# Patient Record
Sex: MALE | Race: BLACK OR AFRICAN AMERICAN
[De-identification: names, ages, dates, MRNs, and addresses within clinical notes are randomized per-mention and may not be internally consistent; named-entity substitution may affect disease eponyms.]

---

## 2017-02-02 ENCOUNTER — HOSPITAL ENCOUNTER (EMERGENCY)
Dept: HOSPITAL 53 - M ED | Age: 37
Discharge: HOME | End: 2017-02-02
Payer: COMMERCIAL

## 2017-02-02 DIAGNOSIS — F17.210: ICD-10-CM

## 2017-02-02 DIAGNOSIS — J45.909: ICD-10-CM

## 2017-02-02 DIAGNOSIS — M54.5: Primary | ICD-10-CM

## 2017-02-02 DIAGNOSIS — G89.29: ICD-10-CM

## 2017-02-02 DIAGNOSIS — Z79.899: ICD-10-CM

## 2017-02-02 NOTE — EDDOCDS
Physician Documentation                                                                           

[facility]                                                                         

Name: Nikita Sr                                                                                 

Age: 36 yrs                                                                                       

Sex: Male                                                                                         

: 1980                                                                                   

MRN: [id number]                                                                                     

Arrival Date: 2017                                                                          

Time: 11:39                                                                                       

Account#: [id number]                                                                              

Bed TR7                                                                                           

Private MD: [facility]                                                                  

Disposition:                                                                                      

17 12:27 Discharged to Home/Self Care. Impression: Low back pain.                           

- Condition is Stable.                                                                            

- Discharge Instructions: Chronic Back Pain, Back Pain, Adult, Easy-to-Read.                      

- Prescriptions for Diclofenac Sodium 75 mg Oral Tablet, Delayed Release (E.C.) - take            

1 tablet by ORAL route 2 times per day; 30 tablet. Robaxin- 750 750 mg Oral Tablet -            

take 1 tablet by ORAL route every 6 hours As needed; 40 tablet.                                 

- Work Release Form - 1 day, Medication Reconciliation, Local Pharmacy Hours form.                

- Follow up: [facility]; When: Call to arrange an appointment; Reason:                  

Further diagnostic work-up, Recheck today's complaints, Continuance of care.                    

- Problem is an acute exacerbation.                                                               

- Symptoms are unchanged.                                                                         

                                                                                                  

                                                                                                  

                                                                                                  

Historical:                                                                                       

- Allergies: No known drug Allergies;                                                             

- Home Meds:                                                                                      

1. albuterol sulfate 90 mcg/actuation Inhl HFAA every 4-6 hours                                 

2. Proventil 90 mcg/actuation Inhl aero prn                                                     

3. baclofen 10 mg Oral tab BID                                                                  

- PMHx: Asthma; chronic back pain;                                                                

- PSHx: none;                                                                                     

- Social history: Smoking status: Patient uses tobacco products, light tobacco smoker.            

No barriers to communication noted, The patient speaks fluent English, Speaks                   

appropriately for age.                                                                          

- Family history: Not pertinent.                                                                  

- : The pt / caregiver states he / she is not on anticoagulants. Home medication list             

is obtained from the patient.                                                                   

- Exposure Risk Screening:: None identified.                                                      

                                                                                                  

                                                                                                  

Vital Signs:                                                                                      

                                                                                             

11:41  / 87; Pulse 72; Resp 18; Temp 99.0(O); Pulse Ox 99% on R/A; Weight 102.06 kg /   elp 

      225 lbs (R); Height 5 ft. 11 in. (180.34 cm) (R); Pain 10/10;                               

11:41 Body Mass Index 31.38 (102.06 kg, 180.34 cm)                                            elp 

                                                                                                  

Signatures:                                                                                       

Ifeoma White RN                      Soheila BlevinsRN                    RN   jo3                                                  

Ezio Traore PA PA   btw                                                  

                                                                                                  

**************************************************************************************************

MTDD

## 2017-02-02 NOTE — EDDOCDS
Nurse's Notes                                                                                     

[facility]                                                                         

Name: Nikita Sr                                                                                 

Age: 36 yrs                                                                                       

Sex: Male                                                                                         

: 1980                                                                                   

MRN: [id number]                                                                                     

Arrival Date: 2017                                                                          

Time: 11:39                                                                                       

Account#: [id number]                                                                              

Bed TR7                                                                                           

Private MD: [facility]                                                                  

Diagnosis: Low back pain                                                                          

                                                                                                  

Presentation:                                                                                     

                                                                                             

11:48 Presenting complaint: Patient states: History of chronic back pain. Today, is worse and jo3 

      is radiating to hips and legs. Adult Sepsis Screening: The patient does not have new or     

      worsening altered mentation. Patient's respiratory rate is less than 22. Systolic blood     

      pressure is greater than 100. Patient has a qSOFA score of 0- Negative Sepsis Screen.       

      Suicide/Homicide risk assessment- the patient denies having any suicidal and/or             

      homicidal ideations and does not present with any other emotional, behavioral or mental     

      health complaints.  Status: Patient is not a  or              

      dependent. Transition of care: patient was not received from another setting of care.       

11:48 Acuity: REGULO Level 5                                                                     jo3 

11:48 Acuity: REGULO Level 4                                                                     jo3 

11:48 Method Of Arrival: Walkin/Carried/Asstd                                                 jo3 

                                                                                                  

Triage Assessment:                                                                                

11:50 General: Appears in no apparent distress, Behavior is appropriate for age, cooperative. jo3 

      Pain: Pain currently is 10 out of 10 on a pain scale. HIV screening NA for this visit       

      Offered previously. Neurological: Level of Consciousness is awake, alert, Oriented to       

      person, place, time. Respiratory: Airway is patent Respiratory effort is even,              

      unlabored.                                                                                  

                                                                                                  

Historical:                                                                                       

- Allergies: No known drug Allergies;                                                             

- Home Meds:                                                                                      

1. albuterol sulfate 90 mcg/actuation Inhl HFAA every 4-6 hours                                 

2. Proventil 90 mcg/actuation Inhl aero prn                                                     

3. baclofen 10 mg Oral tab BID                                                                  

- PMHx: Asthma; chronic back pain;                                                                

- PSHx: none;                                                                                     

- Social history: Smoking status: Patient uses tobacco products, light tobacco smoker.            

No barriers to communication noted, The patient speaks fluent English, Speaks                   

appropriately for age.                                                                          

- Family history: Not pertinent.                                                                  

- : The pt / caregiver states he / she is not on anticoagulants. Home medication list             

is obtained from the patient.                                                                   

- Exposure Risk Screening:: None identified.                                                      

                                                                                                  

                                                                                                  

Screenin:41 Screening information is obtained from prior medical records. Fall risk: No risks       kcs 

      identified. Assistance ADL's: requires no assistance with activities of daily living.       

      Abuse/DV Screen: The patient / caregiver reports he/she is: not in a situation that         

      causes fear, pain or injury. Nutritional screening: No deficits noted. Advance              

      Directives: Currently, there is no health care proxy. There is no living will. home         

      support is adequate.                                                                        

                                                                                                  

Assessment:                                                                                       

12:39 General: Appears comfortable, well developed, well nourished, well groomed, Behavior is kcs 

      cooperative, pleasant. Pain: Complains of pain in low back. Awake, alert, oriented.         

      Skin warm and dry. Moves all extremities. Respirations unlabored. No apparent distress.     

      The patient / caregiver is instructed regarding the plan of care and ED course.             

      Physical assessment to be completed by PA/VALENTE.                                             

                                                                                                  

Vital Signs:                                                                                      

11:41  / 87; Pulse 72; Resp 18; Temp 99.0(O); Pulse Ox 99% on R/A; Weight 102.06 kg     elp 

      (R); Height 5 ft. 11 in. (180.34 cm) (R); Pain 10/10;                                       

11:41 Body Mass Index 31.38 (102.06 kg, 180.34 cm)                                            elp 

                                                                                                  

Vitals:                                                                                           

11:41 Log In Time: 2017 at 11:35.                                                elp 

                                                                                                  

ED Course:                                                                                        

11:40 Patient visited by Sushila Ba PCA.                                                  elp 

11:40 [facility] is Private Physician.                                              elp 

11:40 Patient moved to Waiting                                                                elp 

11:41 Patient visited by Sushila Ba PCA.                                                  elp 

11:41 Patient moved to Pre RCE                                                                elp 

11:49 Triage Initiated                                                                        jo3 

11:50 Patient visited by Soheila Dudley RN.                                                jo3 

12:05 Patient moved to Triage 1                                                               kcs 

12:18 [facility]                                                         btw 

12:18 Aubrie Peters MD is Attending Physician.                                      btw 

12:18 Patient visited by Ezio Traore PA.                                              btw 

12:27 [facility] is Referral Physician.                                             btw 

12:38 Patient moved to TR7                                                                    kcs 

12:39 Patient has correct armband on for positive identification.                             kcs 

12:40 No IV's were initiated during this patient's visit. No procedures done that require     kcs 

      assistance.                                                                                 

                                                                                                  

Order Results:                                                                                    

There are currently no results for this order.                                                    

Outcome:                                                                                          

12:27 Discharge ordered by Provider.                                                          btw 

12:39 The following High Risk Discharge criteria are identified: None. Discharged to home     kcs 

      ambulatory. Condition: stable. Discharge instructions given to patient, Instructed on       

      discharge instructions, follow up and referral plans. medication usage, no driving          

      heavy equipment, no drinking with medication, Demonstrated understanding of                 

      instructions, medications, Pt was receptive of discharge instructions/ teaching. Work       

      note provided to patient. No special radiology studies were completed.                      

12:40 Discharge Assessment: Patient awake, alert and oriented x 3. No cognitive and/or        kcs 

      functional deficits noted. Patient verbalized understanding of disposition                  

      instructions. Patient awake and alert. patient administered narcotics - no. Property        

      sent home with patient.                                                                     

12:41 Patient left the ED.                                                                    kcs 

                                                                                                  

Signatures:                                                                                       

Ifeoma White, RN                      RN   Soheila Camarillo RN                    RN   Ezio Rucker PA PA   btw                                                  

Jesenia, Sushila, PCA                      PCA  elp                                                  

                                                                                                  

**************************************************************************************************

ROSSID

## 2017-02-04 NOTE — EDDOCDS
Physician Documentation                                                                           

[facility]                                                                         

Name: Nikita Sr                                                                                 

Age: 36 yrs                                                                                       

Sex: Male                                                                                         

: 1980                                                                                   

MRN: [id number]                                                                                     

Arrival Date: 2017                                                                          

Time: 11:39                                                                                       

Account#: [id number]                                                                              

Bed TR7                                                                                           

Private MD: [facility]                                                                  

Disposition:                                                                                      

17 12:27 Discharged to Home/Self Care. Impression: Low back pain.                           

- Condition is Stable.                                                                            

- Discharge Instructions: Chronic Back Pain, Back Pain, Adult, Easy-to-Read.                      

- Prescriptions for Diclofenac Sodium 75 mg Oral Tablet, Delayed Release (E.C.) - take            

1 tablet by ORAL route 2 times per day; 30 tablet. Robaxin- 750 750 mg Oral Tablet -            

take 1 tablet by ORAL route every 6 hours As needed; 40 tablet.                                 

- Work Release Form - 1 day, Medication Reconciliation, Local Pharmacy Hours form.                

- Follow up: [facility]; When: Call to arrange an appointment; Reason:                  

Further diagnostic work-up, Recheck today's complaints, Continuance of care.                    

- Problem is an acute exacerbation.                                                               

- Symptoms are unchanged.                                                                         

                                                                                                  

                                                                                                  

                                                                                                  

Historical:                                                                                       

- Allergies: No known drug Allergies;                                                             

- Home Meds:                                                                                      

1. albuterol sulfate 90 mcg/actuation Inhl HFAA every 4-6 hours                                 

2. Proventil 90 mcg/actuation Inhl aero prn                                                     

3. baclofen 10 mg Oral tab BID                                                                  

- PMHx: Asthma; chronic back pain;                                                                

- PSHx: none;                                                                                     

- Social history: Smoking status: Patient uses tobacco products, light tobacco smoker.            

No barriers to communication noted, The patient speaks fluent English, Speaks                   

appropriately for age.                                                                          

- Family history: Not pertinent.                                                                  

- : The pt / caregiver states he / she is not on anticoagulants. Home medication list             

is obtained from the patient.                                                                   

- Exposure Risk Screening:: None identified.                                                      

                                                                                                  

                                                                                                  

Vital Signs:                                                                                      

                                                                                             

11:41  / 87; Pulse 72; Resp 18; Temp 99.0(O); Pulse Ox 99% on R/A; Weight 102.06 kg /   elp 

      225 lbs (R); Height 5 ft. 11 in. (180.34 cm) (R); Pain 10/10;                               

11:41 Body Mass Index 31.38 (102.06 kg, 180.34 cm)                                            elp 

                                                                                                  

MDM:                                                                                              

12:42 [facility] Payment Agreement was scanned into [employer] and attached to record.               jp5 

12:42 Financial registration complete.                                                        jp5 

                                                                                             

11:47 T-Sheet-- Draft Copy was scanned into [employer] and attached to record.                   gb  

                                                                                                  

Signatures:                                                                                       

Ifeoma White RN                      RN   Jayne Garnica, Reg                  Reg  Soheila Alatorre RN RN   jo3                                                  

Ezio Traore PA PA btw Price, Jennalee                              jp5                                                  

                                                                                                  

The chart was reviewed and I authenticate all verbal orders and agree with the evaluation and 
treatment provided.Attachments:

                                                                                             

12:42 [facility] Payment Agreement                                                                jp5 

                                                                                             

11:47 T-Sheet-- Draft Copy                                                                    gb  

                                                                                                  

**************************************************************************************************



*** Chart Complete ***
MTDD

## 2017-02-04 NOTE — EDDOCDS
Physician Documentation                                                                           

[facility]                                                                         

Name: Nikita Sr                                                                                 

Age: 36 yrs                                                                                       

Sex: Male                                                                                         

: 1980                                                                                   

MRN: [id number]                                                                                     

Arrival Date: 2017                                                                          

Time: 11:39                                                                                       

Account#: [id number]                                                                              

Bed TR7                                                                                           

Private MD: [facility]                                                                  

Disposition:                                                                                      

17 12:27 Discharged to Home/Self Care. Impression: Low back pain.                           

- Condition is Stable.                                                                            

- Discharge Instructions: Chronic Back Pain, Back Pain, Adult, Easy-to-Read.                      

- Prescriptions for Diclofenac Sodium 75 mg Oral Tablet, Delayed Release (E.C.) - take            

1 tablet by ORAL route 2 times per day; 30 tablet. Robaxin- 750 750 mg Oral Tablet -            

take 1 tablet by ORAL route every 6 hours As needed; 40 tablet.                                 

- Work Release Form - 1 day, Medication Reconciliation, Local Pharmacy Hours form.                

- Follow up: [facility]; When: Call to arrange an appointment; Reason:                  

Further diagnostic work-up, Recheck today's complaints, Continuance of care.                    

- Problem is an acute exacerbation.                                                               

- Symptoms are unchanged.                                                                         

                                                                                                  

                                                                                                  

                                                                                                  

Historical:                                                                                       

- Allergies: No known drug Allergies;                                                             

- Home Meds:                                                                                      

1. albuterol sulfate 90 mcg/actuation Inhl HFAA every 4-6 hours                                 

2. Proventil 90 mcg/actuation Inhl aero prn                                                     

3. baclofen 10 mg Oral tab BID                                                                  

- PMHx: Asthma; chronic back pain;                                                                

- PSHx: none;                                                                                     

- Social history: Smoking status: Patient uses tobacco products, light tobacco smoker.            

No barriers to communication noted, The patient speaks fluent English, Speaks                   

appropriately for age.                                                                          

- Family history: Not pertinent.                                                                  

- : The pt / caregiver states he / she is not on anticoagulants. Home medication list             

is obtained from the patient.                                                                   

- Exposure Risk Screening:: None identified.                                                      

                                                                                                  

                                                                                                  

Vital Signs:                                                                                      

                                                                                             

11:41  / 87; Pulse 72; Resp 18; Temp 99.0(O); Pulse Ox 99% on R/A; Weight 102.06 kg /   elp 

      225 lbs (R); Height 5 ft. 11 in. (180.34 cm) (R); Pain 10/10;                               

11:41 Body Mass Index 31.38 (102.06 kg, 180.34 cm)                                            elp 

                                                                                                  

MDM:                                                                                              

12:42 [facility] Payment Agreement was scanned into [employer] and attached to record.               jp5 

12:42 Financial registration complete.                                                        jp5 

                                                                                             

11:47 T-Sheet-- Draft Copy was scanned into i[employer] and attached to record.                   gb  

                                                                                                  

Signatures:                                                                                       

Ifeoma White RN                      RN   Jayne Garnica, Reg                  Reg  Soheila Alatorre RN RN   jo3                                                  

Ezio Traore PA PA btw Price, Jennalee                              jp5                                                  

                                                                                                  

The chart was reviewed and I authenticate all verbal orders and agree with the evaluation and 
treatment provided.Attachments:

                                                                                             

12:42 [facility] Payment Agreement                                                                jp5 

                                                                                             

11:47 T-Sheet-- Draft Copy                                                                    gb  

                                                                                                  

**************************************************************************************************



*** Chart Complete ***
MTDD

## 2017-02-04 NOTE — EDDOCDS
Nurse's Notes                                                                                     

[facility]                                                                         

Name: Nikita Sr                                                                                 

Age: 36 yrs                                                                                       

Sex: Male                                                                                         

: 1980                                                                                   

MRN: [id number]                                                                                     

Arrival Date: 2017                                                                          

Time: 11:39                                                                                       

Account#: [id number]                                                                              

Bed TR7                                                                                           

Private MD: [facility]                                                                  

Diagnosis: Low back pain                                                                          

                                                                                                  

Presentation:                                                                                     

                                                                                             

11:48 Presenting complaint: Patient states: History of chronic back pain. Today, is worse and jo3 

      is radiating to hips and legs. Adult Sepsis Screening: The patient does not have new or     

      worsening altered mentation. Patient's respiratory rate is less than 22. Systolic blood     

      pressure is greater than 100. Patient has a qSOFA score of 0- Negative Sepsis Screen.       

      Suicide/Homicide risk assessment- the patient denies having any suicidal and/or             

      homicidal ideations and does not present with any other emotional, behavioral or mental     

      health complaints.  Status: Patient is not a  or              

      dependent. Transition of care: patient was not received from another setting of care.       

11:48 Acuity: REGULO Level 5                                                                     jo3 

11:48 Acuity: REGULO Level 4                                                                     jo3 

11:48 Method Of Arrival: Walkin/Carried/Asstd                                                 jo3 

                                                                                                  

Triage Assessment:                                                                                

11:50 General: Appears in no apparent distress, Behavior is appropriate for age, cooperative. jo3 

      Pain: Pain currently is 10 out of 10 on a pain scale. HIV screening NA for this visit       

      Offered previously. Neurological: Level of Consciousness is awake, alert, Oriented to       

      person, place, time. Respiratory: Airway is patent Respiratory effort is even,              

      unlabored.                                                                                  

                                                                                                  

Historical:                                                                                       

- Allergies: No known drug Allergies;                                                             

- Home Meds:                                                                                      

1. albuterol sulfate 90 mcg/actuation Inhl HFAA every 4-6 hours                                 

2. Proventil 90 mcg/actuation Inhl aero prn                                                     

3. baclofen 10 mg Oral tab BID                                                                  

- PMHx: Asthma; chronic back pain;                                                                

- PSHx: none;                                                                                     

- Social history: Smoking status: Patient uses tobacco products, light tobacco smoker.            

No barriers to communication noted, The patient speaks fluent English, Speaks                   

appropriately for age.                                                                          

- Family history: Not pertinent.                                                                  

- : The pt / caregiver states he / she is not on anticoagulants. Home medication list             

is obtained from the patient.                                                                   

- Exposure Risk Screening:: None identified.                                                      

                                                                                                  

                                                                                                  

Screenin:41 Screening information is obtained from prior medical records. Fall risk: No risks       kcs 

      identified. Assistance ADL's: requires no assistance with activities of daily living.       

      Abuse/DV Screen: The patient / caregiver reports he/she is: not in a situation that         

      causes fear, pain or injury. Nutritional screening: No deficits noted. Advance              

      Directives: Currently, there is no health care proxy. There is no living will. home         

      support is adequate.                                                                        

                                                                                                  

Assessment:                                                                                       

12:39 General: Appears comfortable, well developed, well nourished, well groomed, Behavior is kcs 

      cooperative, pleasant. Pain: Complains of pain in low back. Awake, alert, oriented.         

      Skin warm and dry. Moves all extremities. Respirations unlabored. No apparent distress.     

      The patient / caregiver is instructed regarding the plan of care and ED course.             

      Physical assessment to be completed by PA/VALENTE.                                             

                                                                                                  

Vital Signs:                                                                                      

11:41  / 87; Pulse 72; Resp 18; Temp 99.0(O); Pulse Ox 99% on R/A; Weight 102.06 kg     elp 

      (R); Height 5 ft. 11 in. (180.34 cm) (R); Pain 10/10;                                       

11:41 Body Mass Index 31.38 (102.06 kg, 180.34 cm)                                            elp 

                                                                                                  

Vitals:                                                                                           

11:41 Log In Time: 2017 at 11:35.                                                elp 

                                                                                                  

ED Course:                                                                                        

11:40 Patient visited by Sushila Ba PCA.                                                  elp 

11:40 [facility] is Private Physician.                                              elp 

11:40 Patient moved to Waiting                                                                elp 

11:41 Patient visited by Sushila Ba PCA.                                                  elp 

11:41 Patient moved to Pre RCE                                                                elp 

11:49 Triage Initiated                                                                        jo3 

11:50 Patient visited by Soheila Dudley RN.                                                jo3 

12:05 Patient moved to Triage 1                                                               kcs 

12:18 [facility]                                                         btw 

12:18 Aubrie Peters MD is Attending Physician.                                      btw 

12:18 Patient visited by Ezio Traore PA.                                              btw 

12:27 [facility] is Referral Physician.                                             btw 

12:38 Patient moved to TR7                                                                    kcs 

12:39 Patient has correct armband on for positive identification.                             kcs 

12:40 No IV's were initiated during this patient's visit. No procedures done that require     kcs 

      assistance.                                                                                 

12:42 [facility] Payment Agreement was scanned into [employer] and attached to record.               jp5 

                                                                                             

11:47 T-Sheet-- Draft Copy was scanned into Fab and attached to record.                   gb  

                                                                                                  

Order Results:                                                                                    

There are currently no results for this order.                                                    

Outcome:                                                                                          

                                                                                             

12:27 Discharge ordered by Provider.                                                          btw 

12:39 The following High Risk Discharge criteria are identified: None. Discharged to home     kcs 

      ambulatory. Condition: stable. Discharge instructions given to patient, Instructed on       

      discharge instructions, follow up and referral plans. medication usage, no driving          

      heavy equipment, no drinking with medication, Demonstrated understanding of                 

      instructions, medications, Pt was receptive of discharge instructions/ teaching. Work       

      note provided to patient. No special radiology studies were completed.                      

12:40 Discharge Assessment: Patient awake, alert and oriented x 3. No cognitive and/or        kcs 

      functional deficits noted. Patient verbalized understanding of disposition                  

      instructions. Patient awake and alert. patient administered narcotics - no. Property        

      sent home with patient.                                                                     

12:41 Patient left the ED.                                                                    kcs 

                                                                                                  

Signatures:                                                                                       

Ifeoma White, RN                      RN   Jayne Garnica, Reg                  Reg  Soheila AlatorreRN                    RN   Ezio Rucker PA                  PA   btw                                                  

Sushila Ba, SHAVONNE                      PCA  Sophia Greco                              jp5                                                  

                                                                                                  

**************************************************************************************************



*** Chart Complete ***
MTDD

## 2017-07-24 ENCOUNTER — HOSPITAL ENCOUNTER (EMERGENCY)
Dept: HOSPITAL 53 - M ED | Age: 37
Discharge: HOME | End: 2017-07-24
Payer: OTHER GOVERNMENT

## 2017-07-24 VITALS — DIASTOLIC BLOOD PRESSURE: 96 MMHG

## 2017-07-24 VITALS — SYSTOLIC BLOOD PRESSURE: 155 MMHG

## 2017-07-24 VITALS — BODY MASS INDEX: 32.1 KG/M2 | WEIGHT: 229.28 LBS | HEIGHT: 71 IN

## 2017-07-24 DIAGNOSIS — Z79.899: ICD-10-CM

## 2017-07-24 DIAGNOSIS — F43.10: ICD-10-CM

## 2017-07-24 DIAGNOSIS — M54.9: ICD-10-CM

## 2017-07-24 DIAGNOSIS — G47.30: ICD-10-CM

## 2017-07-24 DIAGNOSIS — F43.0: Primary | ICD-10-CM

## 2017-07-24 LAB
ALBUMIN SERPL BCG-MCNC: 3.8 GM/DL (ref 3.2–5.2)
ALBUMIN/GLOB SERPL: 1.23 {RATIO} (ref 1–1.93)
ALP SERPL-CCNC: 71 U/L (ref 45–117)
ALT SERPL W P-5'-P-CCNC: 68 U/L (ref 12–78)
ANION GAP SERPL CALC-SCNC: 5 MEQ/L (ref 8–16)
AST SERPL-CCNC: 78 U/L (ref 15–37)
BILIRUB CONJ SERPL-MCNC: 0.1 MG/DL (ref 0–0.2)
BILIRUB SERPL-MCNC: 0.5 MG/DL (ref 0.2–1)
BUN SERPL-MCNC: 8 MG/DL (ref 7–18)
CALCIUM SERPL-MCNC: 9.3 MG/DL (ref 8.5–10.1)
CHLORIDE SERPL-SCNC: 104 MEQ/L (ref 98–107)
CO2 SERPL-SCNC: 29 MEQ/L (ref 21–32)
CREAT SERPL-MCNC: 1.22 MG/DL (ref 0.7–1.3)
ERYTHROCYTE [DISTWIDTH] IN BLOOD BY AUTOMATED COUNT: 13.1 % (ref 11.5–14.5)
GFR SERPL CREATININE-BSD FRML MDRD: > 60 ML/MIN/{1.73_M2} (ref 60–?)
GLUCOSE SERPL-MCNC: 72 MG/DL (ref 70–105)
MCH RBC QN AUTO: 31.6 PG (ref 27–33)
MCHC RBC AUTO-ENTMCNC: 34.1 G/DL (ref 32–36.5)
MCV RBC AUTO: 92.6 FL (ref 80–96)
METHADONE UR QL SCN: NEGATIVE
PLATELET # BLD AUTO: 247 K/MM3 (ref 150–450)
POTASSIUM SERPL-SCNC: 3.8 MEQ/L (ref 3.5–5.1)
PROT SERPL-MCNC: 6.9 GM/DL (ref 6.4–8.2)
SODIUM SERPL-SCNC: 138 MEQ/L (ref 136–145)
WBC # BLD AUTO: 4.4 K/MM3 (ref 4–10)

## 2017-07-24 PROCEDURE — 99284 EMERGENCY DEPT VISIT MOD MDM: CPT

## 2017-07-24 PROCEDURE — 80048 BASIC METABOLIC PNL TOTAL CA: CPT

## 2017-07-24 PROCEDURE — 36415 COLL VENOUS BLD VENIPUNCTURE: CPT

## 2017-07-24 PROCEDURE — 80307 DRUG TEST PRSMV CHEM ANLYZR: CPT

## 2017-07-24 PROCEDURE — 84443 ASSAY THYROID STIM HORMONE: CPT

## 2017-07-24 PROCEDURE — 80076 HEPATIC FUNCTION PANEL: CPT

## 2017-07-24 PROCEDURE — 85027 COMPLETE CBC AUTOMATED: CPT

## 2017-12-05 ENCOUNTER — HOSPITAL ENCOUNTER (OUTPATIENT)
Dept: HOSPITAL 53 - M SFHCADAM | Age: 37
End: 2017-12-05
Attending: PHYSICIAN ASSISTANT
Payer: COMMERCIAL

## 2017-12-05 DIAGNOSIS — Z53.8: ICD-10-CM

## 2017-12-05 DIAGNOSIS — R19.7: Primary | ICD-10-CM

## 2017-12-21 ENCOUNTER — HOSPITAL ENCOUNTER (OUTPATIENT)
Dept: HOSPITAL 53 - M SFHCADAM | Age: 37
End: 2017-12-21
Attending: PHYSICIAN ASSISTANT
Payer: COMMERCIAL

## 2017-12-21 DIAGNOSIS — R19.7: Primary | ICD-10-CM

## 2017-12-21 LAB
ALBUMIN SERPL BCG-MCNC: 3.6 GM/DL (ref 3.2–5.2)
ALBUMIN/GLOB SERPL: 1.06 {RATIO} (ref 1–1.93)
ALP SERPL-CCNC: 77 U/L (ref 45–117)
ALT SERPL W P-5'-P-CCNC: 54 U/L (ref 12–78)
AMYLASE SERPL-CCNC: 76 U/L (ref 25–115)
ANION GAP SERPL CALC-SCNC: 5 MEQ/L (ref 8–16)
AST SERPL-CCNC: 23 U/L (ref 7–37)
BASOPHILS # BLD AUTO: 0 10^3/UL (ref 0–0.2)
BASOPHILS NFR BLD AUTO: 0.3 % (ref 0–1)
BILIRUB SERPL-MCNC: 0.3 MG/DL (ref 0.2–1)
BUN SERPL-MCNC: 7 MG/DL (ref 7–18)
CALCIUM SERPL-MCNC: 8.6 MG/DL (ref 8.5–10.1)
CHLORIDE SERPL-SCNC: 111 MEQ/L (ref 98–107)
CO2 SERPL-SCNC: 30 MEQ/L (ref 21–32)
CREAT SERPL-MCNC: 1.04 MG/DL (ref 0.7–1.3)
EOSINOPHIL # BLD AUTO: 0.1 10^3/UL (ref 0–0.5)
EOSINOPHIL NFR BLD AUTO: 1.7 % (ref 0–3)
ERYTHROCYTE [DISTWIDTH] IN BLOOD BY AUTOMATED COUNT: 13.6 % (ref 11.5–14.5)
ERYTHROCYTE [SEDIMENTATION RATE] IN BLOOD BY WESTERGREN METHOD: 12 MM/HR (ref 0–15)
GFR SERPL CREATININE-BSD FRML MDRD: > 60 ML/MIN/{1.73_M2} (ref 60–?)
GLUCOSE SERPL-MCNC: 74 MG/DL (ref 70–105)
IMM GRANULOCYTES NFR BLD: 0.2 % (ref 0–0)
LYMPHOCYTES # BLD AUTO: 3 10^3/UL (ref 1.5–4.5)
LYMPHOCYTES NFR BLD AUTO: 46.5 % (ref 24–44)
MCH RBC QN AUTO: 30.9 PG (ref 27–33)
MCHC RBC AUTO-ENTMCNC: 32.6 G/DL (ref 32–36.5)
MCV RBC AUTO: 94.7 FL (ref 80–96)
MONOCYTES # BLD AUTO: 0.5 10^3/UL (ref 0–0.8)
MONOCYTES NFR BLD AUTO: 8.3 % (ref 0–5)
NEUTROPHILS # BLD AUTO: 2.7 10^3/UL (ref 1.8–7.7)
NEUTROPHILS NFR BLD AUTO: 43 % (ref 36–66)
NRBC BLD AUTO-RTO: 0 % (ref 0–0)
PLATELET # BLD AUTO: 288 10^3/UL (ref 150–450)
POTASSIUM SERPL-SCNC: 5 MEQ/L (ref 3.5–5.1)
PROT SERPL-MCNC: 7 GM/DL (ref 6.4–8.2)
SODIUM SERPL-SCNC: 146 MEQ/L (ref 136–145)
WBC # BLD AUTO: 6.4 10^3/UL (ref 4–10)

## 2018-12-19 ENCOUNTER — HOSPITAL ENCOUNTER (OUTPATIENT)
Dept: HOSPITAL 53 - M ADAMS | Age: 38
End: 2018-12-19
Attending: FAMILY MEDICINE
Payer: COMMERCIAL

## 2018-12-19 DIAGNOSIS — M25.552: Primary | ICD-10-CM

## 2018-12-19 NOTE — REP
Nickel:  Left hip pain.

 

Technique:  Neutral and frog lateral views of the left hip.

 

Findings:

Minimal joint space narrowing with subchondral sclerosis to the acetabular roof

is appreciated.  Linear calcification in the soft tissues overlying the medial

thigh.

 

Impression:

Mild age-related degenerative changes.

Chronic soft tissue calcification.

 

 

Electronically Signed by

Dmitry Ramirez MD 12/19/2018 07:22 P

## 2018-12-23 ENCOUNTER — HOSPITAL ENCOUNTER (EMERGENCY)
Dept: HOSPITAL 53 - M ED | Age: 38
Discharge: HOME | End: 2018-12-23
Payer: OTHER GOVERNMENT

## 2018-12-23 VITALS
HEIGHT: 71 IN | BODY MASS INDEX: 30.86 KG/M2 | WEIGHT: 220.46 LBS | SYSTOLIC BLOOD PRESSURE: 162 MMHG | DIASTOLIC BLOOD PRESSURE: 94 MMHG

## 2018-12-23 DIAGNOSIS — F17.210: ICD-10-CM

## 2018-12-23 DIAGNOSIS — M54.42: Primary | ICD-10-CM

## 2023-10-09 ENCOUNTER — HOSPITAL ENCOUNTER (OUTPATIENT)
Dept: HOSPITAL 53 - M SLEEP | Age: 43
End: 2023-10-09
Attending: NURSE PRACTITIONER
Payer: COMMERCIAL

## 2023-10-09 DIAGNOSIS — R06.83: Primary | ICD-10-CM

## 2024-10-25 ENCOUNTER — HOSPITAL ENCOUNTER (OUTPATIENT)
Dept: HOSPITAL 53 - M SFHCPLAZ | Age: 44
End: 2024-10-25
Payer: COMMERCIAL

## 2024-10-25 ENCOUNTER — HOSPITAL ENCOUNTER (OUTPATIENT)
Dept: HOSPITAL 53 - M PLAIMG | Age: 44
End: 2024-10-25
Payer: COMMERCIAL

## 2024-10-25 DIAGNOSIS — Z53.9: ICD-10-CM

## 2024-10-25 DIAGNOSIS — J40: Primary | ICD-10-CM
